# Patient Record
(demographics unavailable — no encounter records)

---

## 2025-02-06 NOTE — HISTORY OF PRESENT ILLNESS
[FreeTextEntry1] : [place] time had popcorn, Then while in [place] 1/6/25 had abdominal pain and pressure. Her brother called in script- for Cipro and metronidazole. had bad side effect from the metronidazole. Completed course- and was worried that it may not have gone away , but didn't take another  course of Cipro.  She is feeling better now  last colonoscopy 9/2023

## 2025-05-27 NOTE — HEALTH RISK ASSESSMENT
[Very Good] : ~his/her~  mood as very good [Yes] : Yes [1 or 2 (0 pts)] : 1 or 2 (0 points) [Never (0 pts)] : Never (0 points) [No falls in past year] : Patient reported no falls in the past year [0] : 2) Feeling down, depressed, or hopeless: Not at all (0) [PHQ-2 Negative - No further assessment needed] : PHQ-2 Negative - No further assessment needed [Never] : Never [NO] : No [Patient reported mammogram was normal] : Patient reported mammogram was normal [Patient reported PAP Smear was normal] : Patient reported PAP Smear was normal [Patient reported colonoscopy was normal] : Patient reported colonoscopy was normal [] :  [Sexually Active] : sexually active [Fully functional (bathing, dressing, toileting, transferring, walking, feeding)] : Fully functional (bathing, dressing, toileting, transferring, walking, feeding) [Fully functional (using the telephone, shopping, preparing meals, housekeeping, doing laundry, using] : Fully functional and needs no help or supervision to perform IADLs (using the telephone, shopping, preparing meals, housekeeping, doing laundry, using transportation, managing medications and managing finances) [2 - 3 times a week (3 pts)] : 2 - 3  times a week (3 points) [Patient reported bone density results were abnormal] : Patient reported bone density results were abnormal [Audit-CScore] : 3 [[id number]] : 0 [Change in mental status noted] : No change in mental status noted [Language] : denies difficulty with language [Behavior] : denies difficulty with behavior [Learning/Retaining New Information] : denies difficulty learning/retaining new information [Handling Complex Tasks] : denies difficulty handling complex tasks [Reasoning] : denies difficulty with reasoning [Spatial Ability and Orientation] : denies difficulty with spatial ability and orientation [High Risk Behavior] : no high risk behavior [MammogramDate] : 12/2024 [PapSmearDate] : 12/2024 [BoneDensityDate] : 12/2023 [ColonoscopyDate] : 9/2023 [ColonoscopyComments] : past TA

## 2025-05-27 NOTE — PLAN
[FreeTextEntry1] : check bloods and ESR colonoscopy diagnostic echocardiogram Follow diastolic blood pressure would not start medications at this time

## 2025-05-27 NOTE — CURRENT MEDS
[Takes medication as prescribed] : takes [None] : Patient does not have any barriers to medication adherence [Yes] : Reviewed medication list for presence of high-risk medications. [Benzodiazepines] : benzodiazepines [Opioids] : opioids [Blood Thinners] : blood thinners [Muscle Relaxants] : muscle relaxants [Sedatives] : sedatives [FreeTextEntry1] : Not on any of these medications

## 2025-05-27 NOTE — HISTORY OF PRESENT ILLNESS
[FreeTextEntry1] : Here for complete exam and to follow multiple ongoing conditions Current complaint is lower abdominal discomfort on the left side related to eating certain foods most recently raw sushi concerned she has some type of colon inflammation  [de-identified] : 71-year-old female history of past diverticulitis, dropped bladder, right knee arthritis status post gel injection and PRP, calcified left granuloma on chest x-ray followed by pulmonary, Hyperlipidemia on statin, here for complete exam and to follow ongoing conditions urologist Olya Sherman dropped bladder has pessary Extended monitor May 22, 2024 for 4 days 5 hours normal done for fluttering sensation and heart past diverticulitis some rectal and GI symptoms concerning for inflammation, intestinal regular gyn pap mammo CXR calcified left granuloma sees Mykel exercises knees are oversall better had gel injections had PRP right knee Complains of fluttering sensation in the heart on and off not just at rest

## 2025-05-27 NOTE — ASSESSMENT
[Vaccines Reviewed] : Immunizations reviewed today. Please see immunization details in the vaccine log within the immunization flowsheet.  [FreeTextEntry1] : some change in bowel habits past diverticulitis and benign colon polyps some cahnge i bowel habits recent episodes lower abdominal discomfort not needing antibiotics otherwise healthy  new cardiac murmur Diastolic blood pressure just over 80 under lots of stress

## 2025-05-27 NOTE — PHYSICAL EXAM
[Well Nourished] : well nourished [Well Developed] : well developed [Well-Appearing] : well-appearing [Normal Sclera/Conjunctiva] : normal sclera/conjunctiva [PERRL] : pupils equal round and reactive to light [EOMI] : extraocular movements intact [Normal Outer Ear/Nose] : the outer ears and nose were normal in appearance [Normal Oropharynx] : the oropharynx was normal [No JVD] : no jugular venous distention [No Lymphadenopathy] : no lymphadenopathy [Supple] : supple [Thyroid Normal, No Nodules] : the thyroid was normal and there were no nodules present [No Respiratory Distress] : no respiratory distress  [No Accessory Muscle Use] : no accessory muscle use [Clear to Auscultation] : lungs were clear to auscultation bilaterally [Normal Rate] : normal rate  [Regular Rhythm] : with a regular rhythm [Normal S1, S2] : normal S1 and S2 [Click] : a ~M click was heard [II] : a grade 2 [No Carotid Bruits] : no carotid bruits [No Abdominal Bruit] : a ~M bruit was not heard ~T in the abdomen [No Varicosities] : no varicosities [Pedal Pulses Present] : the pedal pulses are present [No Edema] : there was no peripheral edema [No Palpable Aorta] : no palpable aorta [No Extremity Clubbing/Cyanosis] : no extremity clubbing/cyanosis [Declined Breast Exam] : declined breast exam  [Soft] : abdomen soft [Non Tender] : non-tender [Non-distended] : non-distended [No Masses] : no abdominal mass palpated [No HSM] : no HSM [Normal Bowel Sounds] : normal bowel sounds [No Hernias] : no hernias [Declined Rectal Exam] : declined rectal exam [Normal Axillary Nodes] : no axillary lymphadenopathy [Normal Posterior Cervical Nodes] : no posterior cervical lymphadenopathy [Normal Anterior Cervical Nodes] : no anterior cervical lymphadenopathy [No CVA Tenderness] : no CVA  tenderness [No Spinal Tenderness] : no spinal tenderness [No Joint Swelling] : no joint swelling [Grossly Normal Strength/Tone] : grossly normal strength/tone [No Rash] : no rash [Coordination Grossly Intact] : coordination grossly intact [No Focal Deficits] : no focal deficits [Normal Gait] : normal gait [Normal Affect] : the affect was normal [Normal Insight/Judgement] : insight and judgment were intact